# Patient Record
Sex: MALE | Race: WHITE
[De-identification: names, ages, dates, MRNs, and addresses within clinical notes are randomized per-mention and may not be internally consistent; named-entity substitution may affect disease eponyms.]

---

## 2020-03-10 ENCOUNTER — HOSPITAL ENCOUNTER (OUTPATIENT)
Dept: HOSPITAL 62 - OROUT | Age: 75
Discharge: HOME | End: 2020-03-10
Attending: INTERNAL MEDICINE
Payer: OTHER GOVERNMENT

## 2020-03-10 VITALS — DIASTOLIC BLOOD PRESSURE: 81 MMHG | SYSTOLIC BLOOD PRESSURE: 161 MMHG

## 2020-03-10 DIAGNOSIS — K57.30: ICD-10-CM

## 2020-03-10 DIAGNOSIS — K64.8: ICD-10-CM

## 2020-03-10 DIAGNOSIS — D12.5: ICD-10-CM

## 2020-03-10 DIAGNOSIS — F17.210: ICD-10-CM

## 2020-03-10 DIAGNOSIS — Z12.11: Primary | ICD-10-CM

## 2020-03-10 DIAGNOSIS — K21.9: ICD-10-CM

## 2020-03-10 DIAGNOSIS — I10: ICD-10-CM

## 2020-03-10 DIAGNOSIS — Z86.010: ICD-10-CM

## 2020-03-10 DIAGNOSIS — E78.5: ICD-10-CM

## 2020-03-10 DIAGNOSIS — I73.9: ICD-10-CM

## 2020-03-10 PROCEDURE — 88305 TISSUE EXAM BY PATHOLOGIST: CPT

## 2020-03-10 PROCEDURE — 45380 COLONOSCOPY AND BIOPSY: CPT

## 2020-03-10 PROCEDURE — 00811 ANES LWR INTST NDSC NOS: CPT

## 2020-03-10 NOTE — OPERATIVE REPORT
Operative Report


DATE OF SURGERY: 03/10/20


Operative Report: 





Risks, benefits and alternatives are discussed with the patient


he is brought back to the OR for Propofol sedation


time out is called


colonoscopy performed to the cecum


prep is reasonably good


all segments are visualized


retroflexion performed


PREOPERATIVE DIAGNOSIS: personal history of polyp


POSTOPERATIVE DIAGNOSIS: sigmoid colon polyp removed via biopsy forceps.  

diverticulosis without any diverticulitis.  internal hemorrhoids


OPERATION: colonoscopy with biopsy


SURGEON: ROYA MILLER


ANESTHESIA: LMAC


TISSUE REMOVED OR ALTERED: as noted above


COMPLICATIONS: 





none


ESTIMATED BLOOD LOSS: none


INTRAOPERATIVE FINDINGS: as noted above


PROCEDURE: 





patient tolerated his procedure well


did not have any post procedure complications


discharge date: 3/10/20


discharge diet: regular


Discharge activity: normal


follow up on pathology


5 year surveillance 


2-3 week follow up


patient to call the office or go to ED if there are any other problems